# Patient Record
(demographics unavailable — no encounter records)

---

## 2024-10-15 NOTE — IMAGING
[de-identified] : (Exam: Shoulder)  Laterality is right    Patient is in no acute distress, alert and oriented Sensation is grossly intact to light touch in the hand Axillary sensation is intact in the shoulder Motor function is 5/5 in the hand Deltoid and biceps are firing Capillary refill is less than 2 seconds in the fingers Lymphadenopathy is not present Peripheral edema is not present   Skin is intact Incisions are healed   Active forward elevation is 110 Passive forward elevation is 150 External rotation at the side is 30 Internal rotation behind the back is to the level of sacrum   Forward elevation strength is 4/5 External rotation strength at the side is 4/5 Internal rotation strength at the side is 4/5 Deltoid anterior, posterior and lateral heads are firing [Right] : right shoulder [FreeTextEntry1] : RSA in appropriate position, no evidence of loosening or component malposition.

## 2024-10-15 NOTE — HISTORY OF PRESENT ILLNESS
[de-identified] : Date of initial evaluation: 10/15/2024 Patient age: 66 years Body part causing symptoms: Right shoulder  Location of the pain: lateral Pain score today: 0/10 Duration of symptoms: 2 years  History of injury: reports history of RSA in 2019 with Dr Arvizu. Shoulder functioned well until 2 years ago without injury. Reports worsening of active motion and sensation of instability at night. denies any significant pain. denies liudmila instability. index surgeon recommended no additional surgery  Activities that worsen the pain: OH movements, lifting, reaching, abduction  Radicular symptoms: yes to bilateral shoulders with neck pain Medications attempted for this problem: NSAIDs  PT for this problem: none recent  Injections for this problem: none Prior imaging: CT from 2023 at   Occupation: retired

## 2024-10-15 NOTE — DATA REVIEWED
[CT Scan] : CT scan [Right] : of the right [Shoulder] : shoulder [I independently reviewed and interpreted images and report] : I independently reviewed and interpreted images and report [FreeTextEntry1] : CT with no evidence of component malposition or loosening. mild HO formation in axillary pouch

## 2024-10-15 NOTE — DISCUSSION/SUMMARY
[de-identified] : (Impression) -status post right shoulder reverse arthroplasty with functional limitations -cervical degenerative disk disease  The diagnosis was explained in detail. The potential non-surgical and surgical treatments were reviewed. The relative risks and benefits of each option were considered relative to the patient age, activity level, medical history, symptom severity and previously attempted treatments.  The patient was advised to consult with their primary medical provider prior to initiation of any new medications to reduce the risk of adverse effects specific to their long-term home medications and medical history. The risk of gastrointestinal irritation and kidney injury specific to long-term NSAID use was discussed.  (Plan) -Discussed the complex nature of his condition. -Given his minimal pain and relatively well preserved active forward elevation, as well as appropriate component positioning, discussed that revision shoulder arthroplasty is unlikely to provide predictable improvement of function. -Recommend further cervical evaluation given his neck pain and radicular type symptoms, as this may be impacting shoulder function -Further shoulder testing with aspiration and EMG for axillary nerve function is discussed and deferred at this time. Clinical suspicion of infection is low.  -Physical therapy recommended for stretching and strengthening. -Over the counter NSAID for pain and inflammation, take as needed. -Follow up as needed.   (MDM) Problem Complexity -Moderate: chronic illness with exacerbation   Risk -Low: over the counter medication   Visit Type -New: Patient has not been seen by another provider in my practice within the past 2 years who specializes in orthopedic surgery.

## 2025-01-21 NOTE — ASSESSMENT
[FreeTextEntry1] : We discussed options for treatment of hammertoe deformities.  Surgical discussion was performed however patient would like conservative care options.  We discussed stretching, shoe gear changes, nonmedicated padding and warm compresses and ice packs. We also discussed shoes with a high toe box to avoid pressure on the digits.  Calf stretching and other lower extremity stretching was reviewed with the patient.  Discussed going for outpatient physical therapy.  If pain continues patient will take extra strength Tylenol or NSAIDs.  We reviewed possible cortisone injections if hammertoes lead to bursitis.  Custom orthotics were also discussed and reviewed. Discussed at home physical therapy as well.  We also discussed treatment for fungal toenails consisting of topical medication oral medication and laser patient would rather try topical medication and avoid the risk of oral meds.

## 2025-01-21 NOTE — PHYSICAL EXAM
[General Appearance - Alert] : alert [General Appearance - In No Acute Distress] : in no acute distress [Ankle Swelling (On Exam)] : present [Ankle Swelling Bilaterally] : bilaterally  [Varicose Veins Of Lower Extremities] : bilaterally [] : on both lower extremities [Ankle Swelling On The Left] : moderate [Delayed in the Right Toes] : capillary refills normal in right toes [Delayed in the Left Toes] : capillary refills normal in the left toes [1+] : left foot posterior tibialis 1+ [2+] : left foot dorsalis pedis 2+ [de-identified] : Patient with cavus foot type causing hammertoe deformities 2 through 4 bilateral. [FreeTextEntry1] : Patient with fungal toenails bilateral. [Sensation] : the sensory exam was normal to light touch and pinprick [No Focal Deficits] : no focal deficits [Deep Tendon Reflexes (DTR)] : deep tendon reflexes were 2+ and symmetric [Motor Exam] : the motor exam was normal [Diminished Throughout Right Foot] : diminished sensation with monofilament testing throughout right foot [Diminished Throughout Left Foot] : diminished sensation with monofilament testing throughout left foot [Oriented To Time, Place, And Person] : oriented to person, place, and time [Impaired Insight] : insight and judgment were intact [Affect] : the affect was normal

## 2025-01-21 NOTE — PHYSICAL EXAM
[General Appearance - Alert] : alert [General Appearance - In No Acute Distress] : in no acute distress [Ankle Swelling (On Exam)] : present [Ankle Swelling Bilaterally] : bilaterally  [Varicose Veins Of Lower Extremities] : bilaterally [] : on both lower extremities [Ankle Swelling On The Left] : moderate [Delayed in the Right Toes] : capillary refills normal in right toes [Delayed in the Left Toes] : capillary refills normal in the left toes [1+] : left foot posterior tibialis 1+ [2+] : left foot dorsalis pedis 2+ [de-identified] : Patient with cavus foot type causing hammertoe deformities 2 through 4 bilateral. [FreeTextEntry1] : Patient with fungal toenails bilateral. [Sensation] : the sensory exam was normal to light touch and pinprick [No Focal Deficits] : no focal deficits [Deep Tendon Reflexes (DTR)] : deep tendon reflexes were 2+ and symmetric [Motor Exam] : the motor exam was normal [Diminished Throughout Right Foot] : diminished sensation with monofilament testing throughout right foot [Diminished Throughout Left Foot] : diminished sensation with monofilament testing throughout left foot [Oriented To Time, Place, And Person] : oriented to person, place, and time [Affect] : the affect was normal [Impaired Insight] : insight and judgment were intact

## 2025-01-21 NOTE — REASON FOR VISIT
[Follow-Up Visit] : a follow-up visit for [Onychomycosis] : onychomycosis [FreeTextEntry2] : ROUTINE FOOT CARE [Foot Deformity] : foot deformity

## 2025-04-02 NOTE — ASSESSMENT
[FreeTextEntry1] : We discussed options for treatment of hammertoe deformities.  Surgical discussion was performed however patient would like conservative care options.  We discussed stretching, shoe gear changes, nonmedicated padding and warm compresses and ice packs. We also discussed shoes with a high toe box to avoid pressure on the digits.  Calf stretching and other lower extremity stretching was reviewed with the patient.  Discussed going for outpatient physical therapy.  If pain continues patient will take extra strength Tylenol or NSAIDs.  We reviewed possible cortisone injections if hammertoes lead to bursitis.  Custom orthotics were also discussed and reviewed. Discussed at home physical therapy as well.  We also discussed treatment for fungal toenails consisting of topical medication oral medication and laser patient would rather try topical medication and avoid the risk of oral meds. For the thick fungal toenails sterile debridement was performed with a double-action nail clipper and electrical file.  Patient unable to trim his own nails they are painful thick and fungal.

## 2025-04-02 NOTE — PHYSICAL EXAM
[General Appearance - Alert] : alert [General Appearance - In No Acute Distress] : in no acute distress [Ankle Swelling (On Exam)] : present [Ankle Swelling Bilaterally] : bilaterally  [Varicose Veins Of Lower Extremities] : bilaterally [] : on both lower extremities [Ankle Swelling On The Left] : moderate [Delayed in the Right Toes] : capillary refills normal in right toes [Delayed in the Left Toes] : capillary refills normal in the left toes [1+] : left foot posterior tibialis 1+ [2+] : left foot dorsalis pedis 2+ [de-identified] : Patient with cavus foot type causing hammertoe deformities 2 through 4 bilateral.  Patient recently broke his left wrist.  Nonsurgical treatment has been performed. [FreeTextEntry1] : Patient with fungal toenails bilateral.  Some improvement noted however nails are thick yellow and painful in shoes. [Sensation] : the sensory exam was normal to light touch and pinprick [No Focal Deficits] : no focal deficits [Deep Tendon Reflexes (DTR)] : deep tendon reflexes were 2+ and symmetric [Motor Exam] : the motor exam was normal [Diminished Throughout Right Foot] : diminished sensation with monofilament testing throughout right foot [Diminished Throughout Left Foot] : diminished sensation with monofilament testing throughout left foot [Oriented To Time, Place, And Person] : oriented to person, place, and time [Impaired Insight] : insight and judgment were intact [Affect] : the affect was normal

## 2025-04-02 NOTE — PHYSICAL EXAM
[General Appearance - Alert] : alert [General Appearance - In No Acute Distress] : in no acute distress [Ankle Swelling (On Exam)] : present [Ankle Swelling Bilaterally] : bilaterally  [Varicose Veins Of Lower Extremities] : bilaterally [] : on both lower extremities [Ankle Swelling On The Left] : moderate [Delayed in the Right Toes] : capillary refills normal in right toes [Delayed in the Left Toes] : capillary refills normal in the left toes [1+] : left foot posterior tibialis 1+ [2+] : left foot dorsalis pedis 2+ [de-identified] : Patient with cavus foot type causing hammertoe deformities 2 through 4 bilateral.  Patient recently broke his left wrist.  Nonsurgical treatment has been performed. [FreeTextEntry1] : Patient with fungal toenails bilateral.  Some improvement noted however nails are thick yellow and painful in shoes. [Sensation] : the sensory exam was normal to light touch and pinprick [No Focal Deficits] : no focal deficits [Deep Tendon Reflexes (DTR)] : deep tendon reflexes were 2+ and symmetric [Motor Exam] : the motor exam was normal [Diminished Throughout Right Foot] : diminished sensation with monofilament testing throughout right foot [Diminished Throughout Left Foot] : diminished sensation with monofilament testing throughout left foot [Oriented To Time, Place, And Person] : oriented to person, place, and time [Impaired Insight] : insight and judgment were intact [Affect] : the affect was normal

## 2025-05-19 NOTE — HISTORY OF PRESENT ILLNESS
[FreeTextEntry1] : Reason for visit---HIV changing providers due to distance . ( 45 minutes spent on visit)  67year old male . Had JUAN in . Diagnosed with HIV at [facility]. Followed by Sai Linares --now moved to [place]. Then saw Dr. Duffy and the Dr. Jackson, Too far distance now here . Lives in [place].  Taking Bkary at Present and concerned over low viremia.  --wife negative.  One child 38 .  non smoker ---quit in , non drinker PMH---cardiac---elevated lipids. has cards Dr. Adames-at [facility] --has cardiac surgery at [facility] two stents. and mitral valve replacement.  HIV', born with left club foot.  family---father  of MI, grandfather had diabetes, mother passed away at 95 Has Dr Chester Bryant  is his PCP Both hips  due to wear and tear and origina; club foot. ,  low back fusion ---started after fall slipping on stairs.  total reverse replacement shoulder surgery  retired.  NKA medication Biktarvy, ASA 81 mg , Crestor , azetimide, vitamin D 3  plan---Changing providers . ( 45 minutes spent on visit)  1) reviewed previous labs today all labs today --- 2) external provider notes rules  3) and see in a month